# Patient Record
(demographics unavailable — no encounter records)

---

## 2025-04-21 NOTE — HISTORY OF PRESENT ILLNESS
[de-identified] : 12-year-old female is here today for evaluation of her left pinky finger.  Patient states yesterday she was playing football with her brothers and the ball jammed her finger.  Since then she been having pain and swelling in the finger.  They went and had x-rays taken this morning and were told there was a fracture.  She denies any pain to the rest of the hand or fingers.  She denies any pain in the wrist.

## 2025-04-21 NOTE — IMAGING
[de-identified] : On examination of her left hand she has swelling and ecchymosis of the pinky finger.  She is tender over the middle phalanx and the PIP joint.  Tenderness over the distal at the proximal phalanx.  No tenderness over the metacarpals.  She is able to flex and extend at the MCP PIP and DIP joints.  There is no rotational deformity.  She is able to open and close her fist.  Sensation is intact throughout, 2+ radial pulse.  X-rays taken in the office today of the left pinky finger show a fracture at the base of the middle phalanx on the dorsal surface.  No other fractures or dislocations noted.

## 2025-04-21 NOTE — DISCUSSION/SUMMARY
[de-identified] : At this time she was placed in AlumaFoam splint.  I discussed with her to keep the splint on at all times.  No gym or sports.  Ice, Tylenol or Motrin as needed for pain.  Will see her back in 1 to 2 weeks for repeat x-rays and evaluation. Patient's parent will call me if any other problems or concerns.  They verbalized understanding and agreed with the plan, all questions were answered in the office today.

## 2025-04-29 NOTE — HISTORY OF PRESENT ILLNESS
[de-identified] : 12-year-old female had an injury to her left little finger.  She comes in today for evaluation.  She was in a splint.  Injury occurred about a week ago.  No complaints.

## 2025-04-29 NOTE — DATA REVIEWED
[FreeTextEntry1] : Radiographs 3 views of the left little finger reviewed showing a nondisplaced left little finger middle phalanx fracture

## 2025-04-29 NOTE — PHYSICAL EXAM
[de-identified] : Remove the patient from her splint.  She has full range of motion of the finger.  There is no rotational abnormality.  There is normal capillary refill.

## 2025-04-29 NOTE — ASSESSMENT
[FreeTextEntry1] : Patient has a left little finger middle phalanx nondisplaced fracture.  She will be kept out of splinting.  Going to buddy tape.  Range of motion exercises.  See us back on a as needed basis.  Return back to gym class in early June risks of playing soccer were discussed.  Use of buddy tape was discussed